# Patient Record
Sex: FEMALE
[De-identification: names, ages, dates, MRNs, and addresses within clinical notes are randomized per-mention and may not be internally consistent; named-entity substitution may affect disease eponyms.]

---

## 2023-12-07 PROBLEM — Z00.00 ENCOUNTER FOR PREVENTIVE HEALTH EXAMINATION: Status: ACTIVE | Noted: 2023-12-07

## 2023-12-13 ENCOUNTER — APPOINTMENT (OUTPATIENT)
Dept: VASCULAR SURGERY | Facility: CLINIC | Age: 38
End: 2023-12-13
Payer: MEDICAID

## 2023-12-13 VITALS
HEIGHT: 65 IN | HEART RATE: 86 BPM | SYSTOLIC BLOOD PRESSURE: 93 MMHG | BODY MASS INDEX: 20.83 KG/M2 | DIASTOLIC BLOOD PRESSURE: 60 MMHG | WEIGHT: 125 LBS

## 2023-12-13 DIAGNOSIS — Z34.90 ENCOUNTER FOR SUPERVISION OF NORMAL PREGNANCY, UNSPECIFIED, UNSPECIFIED TRIMESTER: ICD-10-CM

## 2023-12-13 DIAGNOSIS — I83.893 VARICOSE VEINS OF BILATERAL LOWER EXTREMITIES WITH OTHER COMPLICATIONS: ICD-10-CM

## 2023-12-13 DIAGNOSIS — I83.899 VARICOSE VEINS OF UNSPECIFIED LOWER EXTREMITY WITH OTHER COMPLICATIONS: ICD-10-CM

## 2023-12-13 PROCEDURE — 99203 OFFICE O/P NEW LOW 30 MIN: CPT

## 2023-12-13 PROCEDURE — 93970 EXTREMITY STUDY: CPT

## 2023-12-13 RX ORDER — IRON/IRON ASP GLY/FA/MV-MIN 38 125-25-1MG
TABLET ORAL
Refills: 0 | Status: ACTIVE | COMMUNITY

## 2023-12-18 NOTE — ASSESSMENT
[FreeTextEntry1] : 37 y/o F w/ BLE symptomatic varicose veins.   On exam, RLE with prominent, bulging varicose veins on the anterior aspect of the thigh and calf, and the medial aspect of the calf. LLE with large, bulging varicose veins in the lateral aspect of the thigh to calf, and the dorsal aspect of the foot. Spider veins all over the LEs, but mainly concentrated on the ankles.  BLE venous duplex showed no DVT/SVT. No reflux of the GSV/SSV. Gross varicose veins throughout the thighs and calves bilaterally. Findings discussed with pt and recommended to proceed with stab phlebectomy to be performed in the operating room, which will help resolve the symptoms localized over the veins. All complications, risks, and alternatives discussed; all questions were answered. She is not a candidate for RFA or laser surgery, both GSV and SSV have no reflux. Other recommendations include to continue wearing compression stockings 40-50 mmHg daily (prescription sent), keep skin moisturized, and elevate the legs while resting. Walking is encouraged, but standing in place or sitting for too long is not preferred. Pt will f/u soon to schedule procedure if she decides to proceed following the current pregnancy.

## 2023-12-18 NOTE — ADDENDUM
[FreeTextEntry1] : I, Dr. Reynold Kumar, personally performed the evaluation and management (E/M) services for this new patient.  That E/M includes conducting the initial examination, assessing all conditions, and establishing the plan of care.  Today, my ACP, Beba Marti NP, was here to observe my evaluation and management services for this patient to be followed going forward.  The documentation for this encounter was entered by Sandra Shea acting as a scribe for Dr. Reynold Kumar.

## 2023-12-18 NOTE — PROCEDURE
[FreeTextEntry1] : BLE venous duplex ordered to r/o aniff and eval vv, shows: no DVT/SVT. No reflux of the GSV/SSV. Gross varicose veins throughout the thighs and calves bilaterally.

## 2023-12-18 NOTE — PHYSICAL EXAM
[Varicose Veins Of Lower Extremities] : bilaterally [Ankle Swelling On The Left] : moderate [Ankle Swelling (On Exam)] : not present [] : not present [de-identified] : WN/WD [FreeTextEntry1] : RLE with prominent, bulging varicose veins on the anterior aspect of the thigh and calf, and the medial aspect of the calf. LLE with large, bulging varicose veins in the lateral aspect of the thigh to calf, and the dorsal aspect of the foot. Spider veins all of the LEs, but mainly concentrated on the ankles.   [de-identified] : FROM

## 2023-12-18 NOTE — HISTORY OF PRESENT ILLNESS
[FreeTextEntry1] : 39 y/o F referred by [facility]. Pt has iron deficiency (receiving iron infusions) and 6 children, currently pregnant with her 7th (23 weeks). She presents today for bilateral varicose veins that are more prominent on her LLE. She reports unbearable pain during her pregnancies, which have worsened with subsequent pregnancies. Her ankles and feet are numb when standing due to the varices and has random foot spasms on her L foot. She denies pain in between pregnancies. Pt wears compression stockings regularly, 30-40 mmHg and is on her feet a lot.  Denies any hx of bleeding/clotting disorders, ulcers, or leg tiredness.    SHx: - Never Smoker  FHx: -No family history of varicose veins  Accompanied by . Lives in [place]